# Patient Record
Sex: MALE | Race: WHITE | NOT HISPANIC OR LATINO | URBAN - METROPOLITAN AREA
[De-identification: names, ages, dates, MRNs, and addresses within clinical notes are randomized per-mention and may not be internally consistent; named-entity substitution may affect disease eponyms.]

---

## 2022-05-16 ENCOUNTER — EMERGENCY (EMERGENCY)
Facility: HOSPITAL | Age: 42
LOS: 1 days | Discharge: HOME | End: 2022-05-16
Attending: EMERGENCY MEDICINE
Payer: COMMERCIAL

## 2022-05-16 VITALS
TEMPERATURE: 98 F | OXYGEN SATURATION: 98 % | HEART RATE: 102 BPM | SYSTOLIC BLOOD PRESSURE: 120 MMHG | WEIGHT: 145.06 LBS | RESPIRATION RATE: 16 BRPM | DIASTOLIC BLOOD PRESSURE: 80 MMHG | HEIGHT: 69 IN

## 2022-05-16 PROCEDURE — 99283 EMERGENCY DEPT VISIT LOW MDM: CPT | Mod: 25

## 2022-05-16 PROCEDURE — 99284 EMERGENCY DEPT VISIT MOD MDM: CPT

## 2022-05-16 PROCEDURE — 73030 X-RAY EXAM OF SHOULDER: CPT | Mod: 26,LT

## 2022-05-16 PROCEDURE — 73030 X-RAY EXAM OF SHOULDER: CPT

## 2022-05-16 RX ORDER — LIDOCAINE 4 G/100G
1 CREAM TOPICAL ONCE
Refills: 0 | Status: COMPLETED | OUTPATIENT
Start: 2022-05-16 | End: 2022-05-16

## 2022-05-16 RX ORDER — METHOCARBAMOL 500 MG/1
2 TABLET, FILM COATED ORAL
Qty: 12 | Refills: 0
Start: 2022-05-16 | End: 2022-05-18

## 2022-05-16 RX ORDER — ACETAMINOPHEN 500 MG
975 TABLET ORAL ONCE
Refills: 0 | Status: COMPLETED | OUTPATIENT
Start: 2022-05-16 | End: 2022-05-16

## 2022-05-16 RX ORDER — METHOCARBAMOL 500 MG/1
500 TABLET, FILM COATED ORAL ONCE
Refills: 0 | Status: COMPLETED | OUTPATIENT
Start: 2022-05-16 | End: 2022-05-16

## 2022-05-16 RX ADMIN — LIDOCAINE 1 PATCH: 4 CREAM TOPICAL at 11:52

## 2022-05-16 RX ADMIN — METHOCARBAMOL 500 MILLIGRAM(S): 500 TABLET, FILM COATED ORAL at 11:59

## 2022-05-16 RX ADMIN — Medication 975 MILLIGRAM(S): at 11:52

## 2022-05-16 NOTE — ED PROVIDER NOTE - CLINICAL SUMMARY MEDICAL DECISION MAKING FREE TEXT BOX
42M w/o Sig PMH CC left shoulder pain given hx and physical suspicion for rotator cuff tear, will get xray to r/o fx w/ ortho f/.u 42M w/o Sig PMH CC left shoulder pain given hx and physical suspicion for rotator cuff tear, will get xray to r/o fx w/ ortho f/.u  ATTG: : shoulder pain after throwing a stick, likely msk, poss rotator cuff tear, lig or tendon injury will treat with pain medication, xray and follow up with ortho.

## 2022-05-16 NOTE — ED ADULT NURSE NOTE - OBJECTIVE STATEMENT
43 y/o M with no reported pmhx presents with Left shoulder pain after throwing a stick last Thursday. Pt states he felt a tearing feeling while throwing. Pt reports difficulty lifting his arm due to pain. and denies relief of pain from OTC medications. VSS, skin intact, + /equal pulses bilaterally.

## 2022-05-16 NOTE — ED PROVIDER NOTE - PATIENT PORTAL LINK FT
You can access the FollowMyHealth Patient Portal offered by [facility] by registering at the following website: [email or web address]. By joining [employer]’s FollowMyHealth portal, you will also be able to view your health information using other applications (apps) compatible with our system.

## 2022-05-16 NOTE — ED PROVIDER NOTE - NSFOLLOWUPCLINICS_GEN_ALL_ED_FT
[facility]  Orthopedic Surgery  [address], [address]rd & [address] [place] [address]  Phone: [phone]  Fax:   Follow Up Time: Urgent

## 2022-05-16 NOTE — ED PROVIDER NOTE - NSFOLLOWUPINSTRUCTIONS_ED_ALL_ED_FT
Today you were seen in the ED for left shoulder pain     It was found that you have no signs of fracture on today's x ray, a copy of the result is attached below.     Please follow up with an orthopedic doctor, information for one is attached below.     You can take methocarbamol, 500mg two pills, at night, do NOT operate heavy machinery or drive while taking this medication.     Please return to the ED if you have any of the following: Your arm becomes numb / you are unable to use it, your arm becomes cold to the touch.

## 2022-05-16 NOTE — ED PROVIDER NOTE - PHYSICAL EXAMINATION
GENERAL: Awake, alert, NAD  LUNGS: CTAB, no wheezes or crackles   CARDIAC: RRR, no m/r/g  ABDOMEN: Soft, , non tender, non distended, no rebound, no guarding  BACK: No midline spinal tenderness, no CVA tenderness  EXT:left ROM limited 2/2 due to pain, mild swelling overlying shoulder but w/o warmth, pt unable to flex shoulder past 90*, no lac / abrasions overlying shoulder   NEURO: A&Ox3. Moving all extremities.  SKIN: Warm and dry. No rash.  PSYCH: Normal affect.

## 2022-05-16 NOTE — ED PROVIDER NOTE - OBJECTIVE STATEMENT
42M no sig PMH CC left shoulder pain. Started thursday night after throwing wood at a [employer], pt is left hand dom. PT notes pain started after event, has been increasing since then, pain located over entire shoulder, has been taking NSAIDS, last known dose 600 at 9AM today, w/ little relief, first time getting symptoms addressed today. Pain worse w/ movement, pain more controlled but still present at rest. Pain currently rated 10/10.     Pt works as lawn maintenance   Denies any F CP SOB N/v

## 2022-05-16 NOTE — ED PROVIDER NOTE - ATTENDING CONTRIBUTION TO CARE
41 y/o m with pmhx denies presents for pain on left shoulder. began immediately on Thursday after throwing a stick. pain localized to shoulder. no associated weakness or numbness. no relief at home with OTC and not able to sleep from pain. no swelling. no headache no chest pain. left hand dominant.   Gen. no acute resp distress but appears uncomfortable from pain   HEENT:  perrl, eomi  Lungs:  b/l bs  CVS: S1S2   Abd;  soft no guarding no distention no ttp  Ext:  no pitting edema no erythema, pulse 2+ radial, sensation intact. limited range of motion from pain on left shoulder.   Neuro: aaox3 no sensory deficits  MSK: strength 5/5 b/l upper and lower ext